# Patient Record
Sex: FEMALE | Race: WHITE | URBAN - METROPOLITAN AREA
[De-identification: names, ages, dates, MRNs, and addresses within clinical notes are randomized per-mention and may not be internally consistent; named-entity substitution may affect disease eponyms.]

---

## 2024-06-15 ENCOUNTER — HOSPITAL ENCOUNTER (OUTPATIENT)
Age: 34
Discharge: HOME | End: 2024-06-15

## 2024-06-15 ENCOUNTER — APPOINTMENT (OUTPATIENT)
Dept: CT IMAGING | Age: 34
End: 2024-06-15
Attending: NURSE PRACTITIONER

## 2024-06-15 VITALS
RESPIRATION RATE: 16 BRPM | HEART RATE: 68 BPM | TEMPERATURE: 99 F | OXYGEN SATURATION: 100 % | SYSTOLIC BLOOD PRESSURE: 104 MMHG | DIASTOLIC BLOOD PRESSURE: 71 MMHG

## 2024-06-15 DIAGNOSIS — R51.9 FACIAL PAIN: Primary | ICD-10-CM

## 2024-06-15 PROCEDURE — 99204 OFFICE O/P NEW MOD 45 MIN: CPT

## 2024-06-15 PROCEDURE — 70486 CT MAXILLOFACIAL W/O DYE: CPT | Performed by: NURSE PRACTITIONER

## 2024-06-15 RX ORDER — NORETHINDRONE ACETATE AND ETHINYL ESTRADIOL 1MG-20(21)
1 KIT ORAL DAILY
COMMUNITY
Start: 2023-11-29

## 2024-06-15 NOTE — ED INITIAL ASSESSMENT (HPI)
Patient reports hx of right sided tooth sensitivity for some time.  States she sees her dentist regularly  an her sinus line is close to her teeth and that may be why she experiences sensitivity.  States over the last 3 weeks she as noted pain to her right upper teeth, right side of face.  States this last week she has had the episodes of pain daily and that they last around 5 minutes.  States over the last  2 days the pain started while eating egg bites.  Denies fevers.  Did have some post nasal drip a few weeks ago but denies current nasal discharge.    
Right UE/Left UE